# Patient Record
Sex: MALE | Race: WHITE
[De-identification: names, ages, dates, MRNs, and addresses within clinical notes are randomized per-mention and may not be internally consistent; named-entity substitution may affect disease eponyms.]

---

## 2023-01-16 ENCOUNTER — HOSPITAL ENCOUNTER (OUTPATIENT)
Dept: HOSPITAL 47 - LABPAT | Age: 88
End: 2023-01-16
Attending: ORTHOPAEDIC SURGERY
Payer: MEDICARE

## 2023-01-16 DIAGNOSIS — M16.12: Primary | ICD-10-CM

## 2023-01-16 LAB
ALBUMIN SERPL-MCNC: 4.1 G/DL (ref 3.8–4.9)
ALBUMIN/GLOB SERPL: 1.56 G/DL (ref 1.6–3.17)
ALP SERPL-CCNC: 62 U/L (ref 41–126)
ALT SERPL-CCNC: 14 U/L (ref 10–49)
ANION GAP SERPL CALC-SCNC: 11.5 MMOL/L (ref 10–18)
APTT BLD: 32.4 SEC (ref 22–30)
AST SERPL-CCNC: 17 U/L (ref 14–35)
BUN SERPL-SCNC: 24.1 MG/DL (ref 9–27)
BUN/CREAT SERPL: 23.86 RATIO (ref 12–20)
CALCIUM SPEC-MCNC: 9.1 MG/DL (ref 8.7–10.3)
CHLORIDE SERPL-SCNC: 104 MMOL/L (ref 96–109)
CO2 SERPL-SCNC: 24.4 MMOL/L (ref 20–27.5)
ERYTHROCYTE [DISTWIDTH] IN BLOOD BY AUTOMATED COUNT: 4.38 X 10*6/UL (ref 4.4–5.6)
ERYTHROCYTE [DISTWIDTH] IN BLOOD: 14.1 % (ref 11.5–14.5)
GLOBULIN SER CALC-MCNC: 2.6 G/DL (ref 1.6–3.3)
GLUCOSE SERPL-MCNC: 89 MG/DL (ref 70–110)
HCT VFR BLD AUTO: 40.3 % (ref 39.6–50)
HGB BLD-MCNC: 12.9 G/DL (ref 13–17)
INR PPP: 1 (ref ?–1.2)
MCH RBC QN AUTO: 29.5 PG (ref 27–32)
MCHC RBC AUTO-ENTMCNC: 32 G/DL (ref 32–37)
MCV RBC AUTO: 92 FL (ref 80–97)
NRBC BLD AUTO-RTO: 0 /100 WBCS (ref 0–0)
PH UR: 5.5 [PH] (ref 5–8)
PLATELET # BLD AUTO: 247 X 10*3/UL (ref 140–440)
POTASSIUM SERPL-SCNC: 4.5 MMOL/L (ref 3.5–5.5)
PROT SERPL-MCNC: 6.7 G/DL (ref 6.2–8.2)
PT BLD: 10.5 SEC (ref 9–12)
SODIUM SERPL-SCNC: 140 MMOL/L (ref 135–145)
SP GR UR: 1.02 (ref 1–1.03)
UROBILINOGEN UR QL: 0.2
WBC # BLD AUTO: 6.72 X 10*3/UL (ref 4.5–10)

## 2023-01-16 PROCEDURE — 85027 COMPLETE CBC AUTOMATED: CPT

## 2023-01-16 PROCEDURE — 81001 URINALYSIS AUTO W/SCOPE: CPT

## 2023-01-16 PROCEDURE — 85730 THROMBOPLASTIN TIME PARTIAL: CPT

## 2023-01-16 PROCEDURE — 80053 COMPREHEN METABOLIC PANEL: CPT

## 2023-01-16 PROCEDURE — 87070 CULTURE OTHR SPECIMN AEROBIC: CPT

## 2023-01-16 PROCEDURE — 85610 PROTHROMBIN TIME: CPT

## 2023-01-24 ENCOUNTER — HOSPITAL ENCOUNTER (OUTPATIENT)
Dept: HOSPITAL 47 - OR | Age: 88
LOS: 3 days | Discharge: NURSING, REHAB OR LONG-TERM CARE | End: 2023-01-27
Attending: ORTHOPAEDIC SURGERY
Payer: MEDICARE

## 2023-01-24 VITALS — BODY MASS INDEX: 29 KG/M2

## 2023-01-24 DIAGNOSIS — G89.18: ICD-10-CM

## 2023-01-24 DIAGNOSIS — Z79.899: ICD-10-CM

## 2023-01-24 DIAGNOSIS — Z86.16: ICD-10-CM

## 2023-01-24 DIAGNOSIS — Z95.5: ICD-10-CM

## 2023-01-24 DIAGNOSIS — E78.5: ICD-10-CM

## 2023-01-24 DIAGNOSIS — Z85.46: ICD-10-CM

## 2023-01-24 DIAGNOSIS — I25.10: ICD-10-CM

## 2023-01-24 DIAGNOSIS — I10: ICD-10-CM

## 2023-01-24 DIAGNOSIS — Z92.3: ICD-10-CM

## 2023-01-24 DIAGNOSIS — Z98.890: ICD-10-CM

## 2023-01-24 DIAGNOSIS — Z79.82: ICD-10-CM

## 2023-01-24 DIAGNOSIS — Z88.6: ICD-10-CM

## 2023-01-24 DIAGNOSIS — Z90.49: ICD-10-CM

## 2023-01-24 DIAGNOSIS — Z87.891: ICD-10-CM

## 2023-01-24 DIAGNOSIS — M16.12: Primary | ICD-10-CM

## 2023-01-24 PROCEDURE — 88300 SURGICAL PATH GROSS: CPT

## 2023-01-24 PROCEDURE — 86850 RBC ANTIBODY SCREEN: CPT

## 2023-01-24 PROCEDURE — 27130 TOTAL HIP ARTHROPLASTY: CPT

## 2023-01-24 PROCEDURE — 97530 THERAPEUTIC ACTIVITIES: CPT

## 2023-01-24 PROCEDURE — 76942 ECHO GUIDE FOR BIOPSY: CPT

## 2023-01-24 PROCEDURE — 73501 X-RAY EXAM HIP UNI 1 VIEW: CPT

## 2023-01-24 PROCEDURE — 97166 OT EVAL MOD COMPLEX 45 MIN: CPT

## 2023-01-24 PROCEDURE — 86901 BLOOD TYPING SEROLOGIC RH(D): CPT

## 2023-01-24 PROCEDURE — 97116 GAIT TRAINING THERAPY: CPT

## 2023-01-24 PROCEDURE — 64447 NJX AA&/STRD FEMORAL NRV IMG: CPT

## 2023-01-24 PROCEDURE — 86900 BLOOD TYPING SEROLOGIC ABO: CPT

## 2023-01-24 PROCEDURE — 85025 COMPLETE CBC W/AUTO DIFF WBC: CPT

## 2023-01-24 PROCEDURE — 97161 PT EVAL LOW COMPLEX 20 MIN: CPT

## 2023-01-24 RX ADMIN — POTASSIUM CHLORIDE SCH MLS: 14.9 INJECTION, SOLUTION INTRAVENOUS at 10:08

## 2023-01-24 RX ADMIN — DOCUSATE SODIUM AND SENNOSIDES SCH EACH: 50; 8.6 TABLET ORAL at 21:42

## 2023-01-24 RX ADMIN — ASPIRIN SCH: 325 TABLET ORAL at 21:44

## 2023-01-24 RX ADMIN — ASPIRIN 325 MG ORAL TABLET SCH MG: 325 PILL ORAL at 21:42

## 2023-01-24 RX ADMIN — HYDROMORPHONE HYDROCHLORIDE PRN MG: 1 INJECTION, SOLUTION INTRAMUSCULAR; INTRAVENOUS; SUBCUTANEOUS at 16:28

## 2023-01-24 NOTE — FL
EXAMINATION TYPE: FL guidance operating room

 

DATE OF EXAM: 1/24/2023

 

FLUOROSCOPY

 

Fluoroscopy time of 56 seconds was used during anterior left hip replacement.  3 image/s document/s t
he procedure.

## 2023-01-24 NOTE — P.ANPRN
Procedure Note - Anesthesia





- Nerve Block Performed


  ** Left Davey Single


Time Out Performed: Yes (1010)


Date of Procedure: 01/24/23


Procedure Start Time: 10:10


Procedure Stop Time: 10:19


Location of Patient: PreOp


Indication: Acute Post-Operative Pain, Dx/Pain Location, Requested by Surgeon


Specifically requested for management of pain by : Jarocho Baron


Sedation Type: Sedate with meaningful contact maintained


Preparation: Sterile Prep


Position: Supine


Catheter: None


Needle Types: Pajunk


Needle Gauge: 21 (100 mm)


Ultrasound used to visualize needle placement: Yes


Ultrasound used to observe medication spread: Yes


Injectate: 0.5% Ropivacaine (see comment for volume) (20 cc + 10 cc of Normal 

saline)


Blood Aspirated: No


Pain Paresthesia on Injection Noted: No


Resistance on Injection: Normal


Image Stored and Saved: Yes


Events: Uneventful and Well Tolerated

## 2023-01-24 NOTE — P.OP
Date of Procedure: 01/24/23


Preoperative Diagnosis: 


Severe osteoarthritis left hip


Postoperative Diagnosis: 


Severe osteoarthritis left hip


Procedure(s) Performed: 


Left total hip arthroplasty with a direct anterior approach


Implants: 


[employer] Polarstem standard size 4 with a collar


[employer] R3, multi-hole hemispherical acetabular shell, 56 mm


[employer] Reflection 6.5 mm cancellus screw, 20 mm 2, 25 mm, 15 mm


[employer] R3, XLPE 20 acetabular liner 


[employer] Oxinium femoral head 36 m, +8


All components were press-fit.


The articulation is Oxinium on polyethylene.


Anesthesia: spinal


Surgeon: Jarocho Baron


Assistant #1: Sarina Gonzales


Estimated Blood Loss (ml): 200


Pathology: other (Femoral head)


Condition: stable


Disposition: PACU


Indications for Procedure: 


After failure of conservative treatment we discussed the surgical and 

nonsurgical treatment options at length.  Patient wishes to proceed with a total

hip arthroplasty with a direct anterior approach.  Complications specific to 

this procedure were discussed at length, including but not limited to infection,

leg length discrepancy, dislocation, nerve injury, and fracture.  Covid-19 was 

also discussed at length with the patient, and they are aware of the current 

policies and procedures.  The patient was given the option of delaying surgery, 

but they elect to proceed knowing these risks.  Patient is aware of all these 

complications and informed consent was obtained


Operative Findings: 


The operative findings are consistent with severe osteoarthritis of the left hip


Description of Procedure: 


The patient was seen and evaluated in the preoperative area and the consent was 

reviewed.  The operative site was marked with a skin marker.  The patient 

verified the procedure and operative site.  A PENG block was placed by 

anesthesia in the preoperative area. The patient was then brought to the 

operating room and given preoperative antibiotics intravenously.  1 g of 

Tranexamic acid was also given intravenously.  A spinal anesthetic was 

administered by the anesthesia department.   The patient was then placed on the 

Russell table with the bony prominences well-padded.  The hip area was then prepped

with a ChloraPrep solution and draped in the usual sterile fashion.





A universal timeout was then performed, which confirmed the patient's name, 

surgical site, ALLERGIES, and procedure being performed on the consent.  Next 

the incision site was located at 1 cm distal and 4 cm lateral to the anterior 

superior iliac spine.  The skin and subcutaneous tissues were sharply incised.  

Incision was carefully dissected down to the fascia overlying the tensor fascia 

elijah muscle.  This fascia was then incised in line with the muscle fibers.  Care

was taken to stay laterally in order to avoid injuring the lateral femoral 

cutaneous nerve.  Next, using blunt finger dissection, the tensor fascia elijah 

muscle was dissected off its investing fascia.  The muscle was then carefully 

retracted laterally with a cobra retractor over the lateral neck of the femur.  

Next, the circumflex vessels were identified and cauterized using the Aquamantis

device.  The anterior hip capsule was then exposed.  The capsule was then opened

and an inverted T fashion.  The retractors were then placed intracapsularly.  

The retractors were maintained intracapsular throughout the procedure.  The 

proximal femur was then visualized.  Fluoroscopic x-rays were then taken in 

order to evaluate the preoperative leg lengths.  A small amount of traction was 

placed on the leg.





The femoral neck was then osteotomized at the appropriate level above the lesser

trochanter.  A small wedge of bone was then removed from the remaining femoral 

head.  Next, using a corkscrew the femoral head was removed from the acetabulum.

 On gross visual inspection, the femoral head had complete loss of articular 

cartilage and multiple periarticular osteophytes.  The femoral head was then 

measured.  Attention was then turned to the acetabulum.





The acetabulum was exposed and any remaining labrum was excised.  Sequential 

reaming of the acetabulum was performed using fluoroscopic guidance until there 

was a good bed of bleeding cancellus bone.  When the appropriate size was 

reached, a trial was then placed.  The position and fit of the trial was checked

with fluoroscopy.  The trial was then removed.  Then, using fluoroscopic 

guidance, the final implant was impacted at 20 of anteversion and 40 of 

abduction, and fully seated in the acetabulum.  4 screws were then placed in the

acetabulum.  Again fluoroscopy was used to check position of the screws.  Next, 

the liner was then impacted, with a 20 elevated liner located in the anterior 

superior quadrant.  Component locking was confirmed.





Attention was then directed to the femur.  With the aid of the Russell table, the 

femur was externally rotated to approximately 130, extended, and adducted under

the opposite leg.  A side hook was then placed under the proximal femur, and the

side hook elevator was used to elevate the proximal femur while releasing the 

capsule.  Retractors were then placed.  A capsular release was performed, as 

well as a release of the conjoined tendon, which afforded excellent 

visualization of the proximal femur.  Next, a box osteotome was used to 

lateralize the proximal femur.  A  was then used to locate the 

femoral canal.  Sequential broaching was then performed with appropriate size 

which afforded excellent fixation in the proximal femur.  A trial was then 

placed with appropriate head and neck, and the hip was gently reduced with the 

aid of the Russell table.  Fluoroscopy was then used to check position of the 

components, as well as to evaluate the leg lengths and offset.  The leg lengths 

and offset were measured as closely as possible to ensure stability of the hip. 

The hip was then gently dislocated and the trials were then removed.  Final 

implants were then impacted and the hip was again reduced.  Final fluoroscopic 

x-rays confirmed that the components were in anatomic position.  The leg lengths

and offset were measured and were found to coincide with the trial measurements.

 The hip was also taken through range of motion, and found to be stable.





The hip was then copiously irrigated with antibiotic solution with pulsatile 

lavage.  The hip was then irrigated with Irrisept solution.  The soft tissues 

were then injected with a ropivacaine solution.  A second dose of 1 g of 

Tranexamic acid was also given intravenously. 





The fascia was then closed with 2-0 strata fix suture.  The subcutaneous tissue 

was closed with 3-0 Vicryl.  The subcuticular tissue was closed with 3-0 strata 

fix suture.  The skin was then closed with Exofin skin glue.  After the glue and

dried, and Optifoam silver impregnated dressing was applied.  The patient was 

then transferred to the recovery room in stable condition.





The assistant  YASIR Cox was required due to the complexity of surgery, 

and the need for skilled surgical assistant for positioning, draping, exposure, 

retraction, and closure of the wound.

## 2023-01-24 NOTE — XR
EXAMINATION TYPE: XR Hip Limited LT

 

DATE OF EXAM: 1/24/2023

 

CLINICAL HISTORY: Postoperative evaluation

 

TECHNIQUE: Single portable view of the left hip was submitted.  

 

FINDINGS: Noted are changes of total hip arthroplasty with femoral and acetabular components appearin
g well seated.  Alignment is anatomic.  Postsurgical soft tissue changes are evident. 

 

IMPRESSION: Satisfactory postoperative alignment

## 2023-01-25 LAB
BASOPHILS # BLD AUTO: 0.02 X 10*3/UL (ref 0–0.1)
BASOPHILS NFR BLD AUTO: 0.2 %
EOSINOPHIL # BLD AUTO: 0.04 X 10*3/UL (ref 0.04–0.35)
EOSINOPHIL NFR BLD AUTO: 0.4 %
ERYTHROCYTE [DISTWIDTH] IN BLOOD BY AUTOMATED COUNT: 3.84 X 10*6/UL (ref 4.4–5.6)
ERYTHROCYTE [DISTWIDTH] IN BLOOD: 13.9 % (ref 11.5–14.5)
HCT VFR BLD AUTO: 35.4 % (ref 39.6–50)
HGB BLD-MCNC: 11.5 G/DL (ref 13–17)
IMM GRANULOCYTES BLD QL AUTO: 0.2 %
LYMPHOCYTES # SPEC AUTO: 0.87 X 10*3/UL (ref 0.9–5)
LYMPHOCYTES NFR SPEC AUTO: 9.3 %
MCH RBC QN AUTO: 29.9 PG (ref 27–32)
MCHC RBC AUTO-ENTMCNC: 32.5 G/DL (ref 32–37)
MCV RBC AUTO: 92.2 FL (ref 80–97)
MONOCYTES # BLD AUTO: 1.23 X 10*3/UL (ref 0.2–1)
MONOCYTES NFR BLD AUTO: 13.2 %
NEUTROPHILS # BLD AUTO: 7.14 X 10*3/UL (ref 1.8–7.7)
NEUTROPHILS NFR BLD AUTO: 76.7 %
NRBC BLD AUTO-RTO: 0 /100 WBCS (ref 0–0)
PLATELET # BLD AUTO: 251 X 10*3/UL (ref 140–440)
WBC # BLD AUTO: 9.32 X 10*3/UL (ref 4.5–10)

## 2023-01-25 RX ADMIN — POTASSIUM CHLORIDE SCH: 14.9 INJECTION, SOLUTION INTRAVENOUS at 09:12

## 2023-01-25 RX ADMIN — THERA TABS SCH EACH: TAB at 08:08

## 2023-01-25 RX ADMIN — HYDROMORPHONE HYDROCHLORIDE PRN MG: 1 INJECTION, SOLUTION INTRAMUSCULAR; INTRAVENOUS; SUBCUTANEOUS at 00:04

## 2023-01-25 RX ADMIN — ASPIRIN 325 MG ORAL TABLET SCH MG: 325 PILL ORAL at 08:08

## 2023-01-25 RX ADMIN — DOCUSATE SODIUM AND SENNOSIDES SCH EACH: 50; 8.6 TABLET ORAL at 21:24

## 2023-01-25 RX ADMIN — PANTOPRAZOLE SODIUM SCH MG: 40 TABLET, DELAYED RELEASE ORAL at 08:39

## 2023-01-25 RX ADMIN — ENOXAPARIN SODIUM SCH MG: 30 INJECTION SUBCUTANEOUS at 21:24

## 2023-01-25 RX ADMIN — ASPIRIN SCH: 325 TABLET ORAL at 23:41

## 2023-01-25 RX ADMIN — MONTELUKAST SODIUM SCH MG: 10 TABLET, FILM COATED ORAL at 08:08

## 2023-01-25 NOTE — P.CONS
History of Present Illness





- Reason for Consult


Consult date: 01/24/23


Medical management


Requesting physician: Jarocho Baron





- Chief Complaint


Hip surgery





- History of Present Illness





This is a pleasant 87-year-old patient, follows with Dr. Reese.  Chronic 

stable medical conditions include CAD with stent, hypertension, hyperlipidemia, 

peptic ulcer disease.


Patient earlier today underwent left total hip arthroplasty.  Some pain.  No ch

est pain no shortness breath.  Comfortable.  Laying in bed.





Review of systems:


GEN.:  Retired


EYES: None


HEENT: None


NECK: None


RESPIRATORY: None


CARDIOVASCULAR: None


GASTROINTESTINAL: None


GENITOURINARY: None


MUSCULOSKELETAL: Joint pains


LYMPHATICS: None


HEMATOLOGICAL: None  


PSYCHIATRY: None


NEUROLOGICAL: None





Past medical history to include:


Hypertension, hyperlipidemia, osteoarthritis, prostate cancer in 2004 treated 

with radiation, bleeding peptic ulcer in 1970s,: Infection in December 2022, CAD

with stent 2





Social history:


This smoke in the past chewing tobacco.  No alcohol.  Did multiple jobs 

including .





Physical examination:


VITAL SIGNS: 97.9, 16, 17, 171/73, 98% room air]


GENERAL: BMI 30, declining but awake.


EYES: Pupils equal.  Conjunctiva normal.


HEENT: External appearance of nose and ears normal, oral cavity grossly normal.


NECK: JVD not raised; masses not palpable.


HEART: First and second heart sounds are normal;  no edema.  


LUNGS: Respiratory rate normal; clear to auscultation.  


ABDOMEN: Soft,  nontender, liver spleen not palpable, no masses palpable.  


PSYCH: Alert and oriented x3;  mood  and affect normal.  


MUSCULOSKELETAL:No Clubbing/cyanosis;muscles-grossly intact.  OA


NEUROLOGICAL: Cranial nerves grossly intact; no facial asymmetry,   power and 

sensation grossly intact. 


LYMPHATICS: No lymph nodes palpable in the axilla and neck





INVESTIGATIONS, reviewed in the clinical context:


01/16/2023:


White count 6.7 hemoglobin 12.9 potassium 4.5 creatinine 1.0





Assessment and plan: 1





-Left total hip arthroplasty


Aspirin for DVT prophylaxis.  Pain control.





-Primary osteoarthritis


Pain medications as needed





-CAD with a prior history of stent


Tenormin, aspirin





-Peptic ulcer disease in the past.


Prilosec





-Essential hypertension


Enalapril, Tenormin





-Hyperlipidemia


Simvastatin





Care was discussed with the patient.  Question answered.


Thank you Dr. Baron





Past Medical History


Past Medical History: Cancer, Hyperlipidemia, Hypertension, Osteoarthritis (OA)


Additional Past Medical History / Comment(s): Covid infection Dec 2022,prostate 

CA-2004 40+ radiation txs, bleeding ulcer 1970


History of Any Multi-Drug Resistant Organisms: None Reported


Past Surgical History: Cholecystectomy, Heart Catheterization With Stent, Hernia

Repair


Additional Past Surgical History / Comment(s): cardiac stents x2,rt hip x2,left 

total knee


Past Anesthesia/Blood Transfusion Reactions: No Reported Reaction


Additional Past Anesthesia/Blood Transfusion Reaction / Comm: no hx blood 

transfusion


Date of Last Stent Placement:: 3-30-16,2006


Past Psychological History: No Psychological Hx Reported


Smoking Status: Former smoker


Past Alcohol Use History: None Reported


Additional Past Alcohol Use History / Comment(s): chewed tobacco for 30 yrs,quit

chewing tobacco around age 61s


Past Drug Use History: None Reported





- Past Family History


  ** Mother


Family Medical History: No Reported History





Medications and Allergies


                                Home Medications











 Medication  Instructions  Recorded  Confirmed  Type


 


Aspirin 81 mg PO DAILY 01/19/23 01/19/23 History


 


Enalapril Maleate 20 mg PO QAM 01/19/23 01/19/23 History


 


Enalapril [Vasotec] 10 mg PO  01/19/23 01/19/23 History


 


Fluticasone Nasal Spray [Flonase 2 spray EA NOSTRIL BID PRN 01/19/23 01/19/23 

History





Nasal [place]]    


 


Montelukast Sodium [Singulair] 10 mg PO QA 01/19/23 01/19/23 History


 


Multivitamins, Thera [Multivitamin 1 tab PO DAILY 01/19/23 01/19/23 History





(formulary)]    


 


Omeprazole [PriLOSEC] 20 mg PO QAM 01/19/23 01/19/23 History


 


Simvastatin 40 mg PO  01/19/23 01/19/23 History


 


atenoloL [Tenormin] 25 mg PO  01/19/23 01/19/23 History


 


Aspirin 325 mg PO BID #60 tab 01/24/23  Rx


 


Sennosides [Senokot] 2 tab PO DAILY PRN #60 tablet 01/24/23  Rx


 


traMADol HCl [Ultram] 1 - 2 tab PO Q6H PRN #32 tab 01/24/23  Rx








                                    Allergies











Allergy/AdvReac Type Severity Reaction Status Date / Time


 


atorvastatin Allergy  muscle pain Verified 01/24/23 09:43














Physical Exam


Vitals: 


                                   Vital Signs











  Temp Pulse Resp BP Pulse Ox


 


 01/24/23 20:00  97.9 F  60  17  171/73  98


 


 01/24/23 18:07     170/74 


 


 01/24/23 16:54   57 L   220/90  96


 


 01/24/23 16:48     195/81 


 


 01/24/23 16:39   52 L   195/81  96


 


 01/24/23 16:24   55 L   232/87  99


 


 01/24/23 16:23     220/84 


 


 01/24/23 16:08   65   227/92  99


 


 01/24/23 16:04   55 L   221/73  99


 


 01/24/23 15:55   67   213/90  96


 


 01/24/23 15:39   63   205/79  100


 


 01/24/23 15:24   57 L   200/81  99


 


 01/24/23 15:09   55 L   195/83  98


 


 01/24/23 14:54   57 L   191/75  98


 


 01/24/23 14:39   66   183/86  95


 


 01/24/23 14:23   56 L  18  183/74  99


 


 01/24/23 13:54   65  20  148/61  97


 


 01/24/23 13:39   63  21  140/62  95


 


 01/24/23 13:24   59 L  12  148/63  94 L


 


 01/24/23 13:09   64  9 L  153/67  94 L


 


 01/24/23 10:27   54 L  14  174/72  99


 


 01/24/23 10:04   59 L   214/84 


 


 01/24/23 09:46  97.1 F L  60  16  238/107  99








                                Intake and Output











 01/24/23 01/24/23 01/24/23





 06:59 14:59 22:59


 


Intake Total  1201 720


 


Output Total  200 


 


Balance  1001 720


 


Intake:   


 


  IV  1201 


 


  Oral   720


 


Output:   


 


  Estimated Blood Loss  200 


 


Other:   


 


  Weight  84.3 kg 














Results


CBC & Chem 7: 


                                 01/25/23 05:22

## 2023-01-26 RX ADMIN — MONTELUKAST SODIUM SCH MG: 10 TABLET, FILM COATED ORAL at 08:46

## 2023-01-26 RX ADMIN — ENOXAPARIN SODIUM SCH MG: 30 INJECTION SUBCUTANEOUS at 21:38

## 2023-01-26 RX ADMIN — ENOXAPARIN SODIUM SCH MG: 30 INJECTION SUBCUTANEOUS at 08:46

## 2023-01-26 RX ADMIN — DOCUSATE SODIUM AND SENNOSIDES SCH: 50; 8.6 TABLET ORAL at 21:38

## 2023-01-26 RX ADMIN — ASPIRIN SCH: 325 TABLET ORAL at 21:38

## 2023-01-26 RX ADMIN — THERA TABS SCH EACH: TAB at 08:46

## 2023-01-26 RX ADMIN — PANTOPRAZOLE SODIUM SCH MG: 40 TABLET, DELAYED RELEASE ORAL at 08:46

## 2023-01-26 NOTE — P.PN
Progress Note - Text


Progress Note Date: 01/25/23





- Chief Complaint


Hip surgery





Hospital course


This is a pleasant 87-year-old patient, follows with Dr. Reese.  Chronic 

stable medical conditions include CAD with stent, hypertension, hyperlipidemia, 

peptic ulcer disease.


Patient earlier today underwent left total hip arthroplasty.  Some pain.  No 

chest pain no shortness breath.  Comfortable.  Laying in bed.


January 25: No chest pain no shortness breath.  Oral intake fair.  Plan is for 

patient to go to rehab.  Working with therapy.  Patient concerned about his 

peptic ulcer disease because of aspirin.  Discussed with Rehana Torrez from 

orthopedics.  Change patient to subcu Lovenox.  DC aspirin.





Active Medications





Atenolol (Atenolol 25 Mg Tab)  25 mg PO [facility]


   Last Admin: 01/24/23 21:42 Dose:  25 mg


   


Enoxaparin Sodium (Enoxaparin 30 Mg/0.3 Ml Syringe)  30 mg SQ Q12HR [facility]


   Stop: 02/02/23 23:59


Hydromorphone HCl (Hydromorphone 0.5 Mg/0.5 Ml Syringe)  0.125 mg IVP Q3HR PRN


   PRN Reason: Pain Scale 1 to 3


   Stop: 02/23/23 08:52


Hydromorphone HCl (Hydromorphone 0.5 Mg/0.5 Ml Syringe)  0.5 mg IVP Q3HR PRN


   PRN Reason: Pain Scale 7 to 10


   Stop: 02/23/23 08:52


   Last Admin: 01/25/23 00:04 Dose:  0.5 mg


   


Hydromorphone HCl (Hydromorphone 0.5 Mg/0.5 Ml Syringe)  0.25 mg IVP Q3HR PRN


   PRN Reason: Pain Scale 4 to 6


   Stop: 02/23/23 08:52


Lactated Ringer's (Lactated Ringers)  1,000 mls @ 20 mls/hr IV .Q24H [facility]


   Stop: 02/23/23 09:25


   Last Admin: 01/25/23 09:12 Dose:  Not Given


   


Lisinopril (Lisinopril 20 Mg Tab)  20 mg PO [facility]


   Last Admin: 01/24/23 21:42 Dose:  20 mg


   


Lisinopril (Lisinopril 20 Mg Tab)  40 mg PO [facility]


   Last Admin: 01/25/23 08:08 Dose:  40 mg


   


Magnesium Hydroxide (Magnesium Hydroxide 2,400 Mg/10 Ml Cup)  2,400 mg PO DAILY 

PRN


   PRN Reason: Constipation


   Stop: 02/23/23 08:52


Montelukast Sodium ([facility] 10 Mg Tab)  [facility]


   Last Admin: 01/25/23 08:08 Dose:  10 mg


   


Multivitamins (Multivitamins, Thera 1 Each Tab)  1 each PO DAILY [facility]


   Last Admin: 01/25/23 08:08 Dose:  1 each


   


Naloxone HCl (Naloxone 0.4 Mg/Ml 1 Ml Vial)  0.2 mg IV Q2M PRN


   PRN Reason: Opioid Reversal


   Stop: 02/23/23 08:52


Non-Formulary Medication (Simvastatin [Simvastatin])  40 mg PO [facility]


   Last Admin: 01/24/23 21:44 Dose:  Not Given


   


Pantoprazole Sodium (Pantoprazole 40 Mg Tablet)  40 mg PO [facility]


   Last Admin: 01/25/23 08:39 Dose:  40 mg


   


Senna/Docusate Sodium (Sennosides-Docusate Sodium 1 Each Tab)  2 each PO [facility]


   Stop: 02/23/23 21:01


   Last Admin: 01/24/23 21:42 Dose:  2 each


   


Tramadol HCl (Tramadol 50 Mg Tab)  50 mg PO Q6H PRN


   PRN Reason: Pain Scale 1 to 5


   Stop: 02/23/23 08:56


   Last Admin: 01/25/23 08:31 Dose:  50 mg


   


Tramadol HCl (Tramadol 50 Mg Tab)  100 mg PO QID PRN


   PRN Reason: Pain Scale 6 to 10


   Stop: 02/23/23 08:56


   Last Admin: 01/25/23 13:46 Dose:  100 mg


   








Past medical history to include:


Hypertension, hyperlipidemia, osteoarthritis, prostate cancer in 2004 treated 

with radiation, bleeding peptic ulcer in 1970s,: Infection in December 2022, CAD

 with stent 2





Social history:


This smoke in the past chewing tobacco.  No alcohol.  Did multiple jobs 

including .





Physical examination:


VITAL SIGNS: 97.8, 59, 18, 125/67, 96% room air


GENERAL: Sitting up, comfortable.


EYES: Pupils equal.  Conjunctiva normal.


HEENT: External appearance of nose and ears normal, oral cavity grossly normal.


NECK: JVD not raised; masses not palpable.


HEART: First and second heart sounds are normal;  no edema.  


LUNGS: Respiratory rate normal; clear to auscultation.  


ABDOMEN: Soft,  nontender, liver spleen not palpable, no masses palpable.  


PSYCH: Alert and oriented x3;  mood  and affect normal.  


MUSCULOSKELETAL:No Clubbing/cyanosis;muscles-grossly intact.  OA








INVESTIGATIONS, reviewed in the clinical context:


January 25: White count 9.3 hemoglobin 11.5 platelets 251


01/16/2023:


White count 6.7 hemoglobin 12.9 potassium 4.5 creatinine 1.0





Assessment and plan: 1





-Left total hip arthroplasty


DC aspirin because of peptic ulcer disease.  Subcu Lovenox 30 mg every 12 for 7 

days.  Pain control.





-Primary osteoarthritis


Pain medications as needed





-CAD with a prior history of stent


Tenormin, aspirin 81 mg





-Peptic ulcer disease in the past.


Prilosec





-Essential hypertension


Enalapril, Tenormin





-Hyperlipidemia


Simvastatin





DC aspirin.  Subcu Lovenox.  Other medications to continue.  Patient presented 

to rehab tomorrow.


Thank you Dr. Baron
Progress Note - Text


Progress Note Date: 01/26/23





- Chief Complaint


Hip surgery





Hospital course


This is a pleasant 87-year-old patient, follows with Dr. Reese.  Chronic 

stable medical conditions include CAD with stent, hypertension, hyperlipidemia, 

peptic ulcer disease.


Patient earlier today underwent left total hip arthroplasty.  Some pain.  No 

chest pain no shortness breath.  Comfortable.  Laying in bed.


January 25: No chest pain no shortness breath.  Oral intake fair.  Plan is for 

patient to go to rehab.  Working with therapy.  Patient concerned about his 

peptic ulcer disease because of aspirin.  Discussed with Rehana Torrez from 

orthopedics.  Change patient to subcu Lovenox.  DC aspirin.


January 26: Stable.  Pain control.  Tolerating diet.  Will be going to rehab.  

No new issues.





Meds reviewed





Past medical history to include:


Hypertension, hyperlipidemia, osteoarthritis, prostate cancer in 2004 treated 

with radiation, bleeding peptic ulcer in 1970s,: Infection in December 2022, CAD

with stent 2





Social history:


This smoke in the past chewing tobacco.  No alcohol.  Did multiple jobs 

including .





Physical examination:


VITAL SIGNS: 97.9, 72, 18, 1 57 x 67, 94% room air


GENERAL: Sitting up, comfortable.


EYES: Pupils equal.  Conjunctiva normal.


HEENT: External appearance of nose and ears normal, oral cavity grossly normal.


NECK: JVD not raised; masses not palpable.


HEART: First and second heart sounds are normal;  no edema.  


LUNGS: Respiratory rate normal; clear to auscultation.  


ABDOMEN: Soft,  nontender, liver spleen not palpable, no masses palpable.  


PSYCH: Alert and oriented x3;  mood  and affect normal.  


MUSCULOSKELETAL:No Clubbing/cyanosis;muscles-grossly intact.  OA








INVESTIGATIONS, reviewed in the clinical context:


January 25: White count 9.3 hemoglobin 11.5 platelets 251


01/16/2023:


White count 6.7 hemoglobin 12.9 potassium 4.5 creatinine 1.0





Assessment and plan: 1





-Left total hip arthroplasty


DC aspirin because of peptic ulcer disease.  Subcu Lovenox 30 mg every 12 for 7 

days.  Pain control.





-Primary osteoarthritis


Pain medications as needed





-CAD with a prior history of stent


Tenormin, aspirin 81 mg





-Peptic ulcer disease in the past.


Prilosec





-Essential hypertension


Enalapril, Tenormin





-Hyperlipidemia


Simvastatin





Stable continue current medication treatment plan.


Thank you Dr. Baron
Subjective


Progress Note Date: 01/25/23


Principal diagnosis: 





Primary osteoarthritis left hip.


Status post total left hip arthroplasty with direct anterior approach.


This is an 87-year-old male who is status post total left hip arthroplasty with 

direct anterior approach.  The patient is doing well from an orthopedic sta

ndpoint.  He has no new complaints or concerns today.  Vital signs are stable.








Objective





- Vital Signs


Vital signs: 


                                   Vital Signs











Temp  97.7 F   01/25/23 06:52


 


Pulse  50 L  01/25/23 06:52


 


Resp  17   01/25/23 06:52


 


BP  113/61   01/25/23 06:52


 


Pulse Ox  96   01/25/23 06:52


 


FiO2      








                                 Intake & Output











 01/24/23 01/25/23 01/25/23





 18:59 06:59 18:59


 


Intake Total 1921 730 


 


Output Total 200 350 


 


Balance 1721 380 


 


Weight 84.3 kg  


 


Intake:   


 


  IV 1201  


 


  Intake, IV Titration  250 





  Amount   


 


    Lactated Ringers 1,000 ml  200 





    @ 20 mls/hr IV .Q24H [facility]   





    Rx#:[id number]   


 


    ceFAZolin 2 gm In Sodium  50 





    Chloride 0.9% 50 ml @ 100   





    mls/hr IVPB Q8H [facility] Rx#:   





    [id number]   


 


  Oral 720 480 


 


Output:   


 


  Urine  350 


 


  Estimated Blood Loss 200  














- Exam


This is a pleasant 87-year-old male in no acute distress.  He is alert and orien

lauren 3.  Exam of the left hip reveals that his dressing is clean, dry and 

intact.  He has full foot and ankle motion without difficulty or pain.  

Neurovascular status to the lower extremity is intact.








Assessment and Plan


(1) Primary localized osteoarthritis of left hip


Current Visit: Yes   Status: Acute   Code(s): M16.12 - UNILATERAL PRIMARY 

OSTEOARTHRITIS, LEFT HIP   SNOMED Code(s): 133789027380888


   





(2) Status post total hip replacement, left


Current Visit: Yes   Status: Acute   Code(s): Z96.642 - PRESENCE OF LEFT 

ARTIFICIAL HIP JOINT   SNOMED Code(s): 874385911119


   


Plan: 


The clinical findings are discussed with the patient.  He is awaiting rehab 

placement.  We will begin physical therapy today.  Plan discharged to inpatient 

rehab when cleared medically and placement is arranged.
complains of pain/discomfort

## 2023-01-26 NOTE — P.DS
Providers


Expected date of discharge: 01/26/23


Attending physician: 


Jarocho Baron





Consults: 





                                        





01/24/23 08:51


Consult Physician Routine 


   Consulting Provider: Soham Lyons


   Consult Reason/Comments: medical management


   Do you want consulting provider notified?: Yes











Primary care physician: 


Michele Reese








- Discharge Diagnosis(es)


(1) Primary localized osteoarthritis of left hip


Current Visit: Yes   Status: Acute   





(2) Status post total hip replacement, left


Current Visit: Yes   Status: Acute   


Hospital Course: 


This is a 87-year-old male with known history of degenerative arthritis of the 

left hip.  The patient presented for evaluation as an outpatient.  After 

discussion and consideration patient elects to proceed with total hip 

arthroplasty.  The patient is seen preoperatively by Dr. Baron and medically 

cleared for surgery by their primary care physician.





Patient is admitted to [facility] on 01/24/2020 for total hip 

arthroplasty.  The procedure is performed without complication or sequelae.  The

patient is doing well postoperatively.  Labs and vital signs are stable on day 

of discharge.





On day of discharge patient's hip incision is healing well.  There is minimal 

erythema.  There is no drainage noted at this time.  There is minimal soft 

tissue swelling to the hip and thigh.  Patient has full foot and ankle motion 

without difficulty or pain.  Calf is soft and nontender to palpation.  

Neurovascular status to the left lower extremity is intact.  Patient is 

discharged to rehab in good condition. Please see med rec for accurate list of 

home medications.








Plan - Discharge Summary


Discharge Rx Participant: Yes


New Discharge Prescriptions: 


New


   Sennosides [Senokot] 2 tab PO DAILY PRN #60 tablet


     PRN Reason: Constipation


   traMADol HCl [Ultram] 1 - 2 tab PO Q6H PRN #32 tab


     PRN Reason: Pain


   Aspirin 325 mg PO BID #60 tab





Continue


   atenoloL [Tenormin] 25 mg PO HS


   Fluticasone Nasal Spray [Flonase Nasal Spray] 2 spray EA NOSTRIL BID PRN


     PRN Reason: Congestion


   Montelukast Sodium [Singulair] 10 mg PO QAM


   Simvastatin 40 mg PO HS


   Enalapril [Vasotec] 10 mg PO HS


   Enalapril Maleate 20 mg PO QAM


   Aspirin 81 mg PO DAILY


   Multivitamins, Thera [Multivitamin (formulary)] 1 tab PO DAILY


   Omeprazole [PriLOSEC] 20 mg PO QAM


Discharge Medication List





Aspirin 81 mg PO DAILY 01/19/23 [History]


Enalapril Maleate 20 mg PO QAM 01/19/23 [History]


Enalapril [Vasotec] 10 mg PO HS 01/19/23 [History]


Fluticasone Nasal Spray [Flonase Nasal Spray] 2 spray EA NOSTRIL BID PRN 

01/19/23 [History]


Montelukast Sodium [Singulair] 10 mg PO QAM 01/19/23 [History]


Multivitamins, Thera [Multivitamin (formulary)] 1 tab PO DAILY 01/19/23 

[History]


Omeprazole [PriLOSEC] 20 mg PO QAM 01/19/23 [History]


Simvastatin 40 mg PO HS 01/19/23 [History]


atenoloL [Tenormin] 25 mg PO HS 01/19/23 [History]


Aspirin 325 mg PO BID #60 tab 01/24/23 [Rx]


Sennosides [Senokot] 2 tab PO DAILY PRN #60 tablet 01/24/23 [Rx]


traMADol HCl [Ultram] 1 - 2 tab PO Q6H PRN #32 tab 01/24/23 [Rx]








Follow up Appointment(s)/Referral(s): 


Jarocho Baron DO [Doctor of Osteopathic Medicine] - 2 Weeks


Activity/Diet/Wound Care/Special Instructions: 


Weightbearing as tolerated with walker.


Leave dressing intact.  Dressing may be removed by home care nurse or by patient

in 7 days. Then change dressing twice daily until follow up.


May shower with initial dressing intact and after removal.


If dressing become saturated, please remove.


Please take aspirin 325mg twice daily for 30 days to prevent blood clots.


Recommend use of compression stockings daily until follow up to help prevent 

swelling and blood clots. May remove at night before sleeping. 


Please follow-up with [facility] in 2 weeks and call with any 

questions or concerns, [phone].


Discharge Disposition: TRANSFER TO SNF/ECF

## 2023-01-27 VITALS
DIASTOLIC BLOOD PRESSURE: 56 MMHG | SYSTOLIC BLOOD PRESSURE: 157 MMHG | TEMPERATURE: 98 F | HEART RATE: 60 BPM | RESPIRATION RATE: 18 BRPM

## 2023-01-27 LAB
BASOPHILS # BLD AUTO: 0.04 X 10*3/UL (ref 0–0.1)
BASOPHILS NFR BLD AUTO: 0.4 %
EOSINOPHIL # BLD AUTO: 0.18 X 10*3/UL (ref 0.04–0.35)
EOSINOPHIL NFR BLD AUTO: 1.8 %
ERYTHROCYTE [DISTWIDTH] IN BLOOD BY AUTOMATED COUNT: 4.01 X 10*6/UL (ref 4.4–5.6)
ERYTHROCYTE [DISTWIDTH] IN BLOOD: 13.8 % (ref 11.5–14.5)
HCT VFR BLD AUTO: 36.6 % (ref 39.6–50)
HGB BLD-MCNC: 11.9 G/DL (ref 13–17)
IMM GRANULOCYTES BLD QL AUTO: 0.4 %
LYMPHOCYTES # SPEC AUTO: 0.7 X 10*3/UL (ref 0.9–5)
LYMPHOCYTES NFR SPEC AUTO: 7.2 %
MCH RBC QN AUTO: 29.7 PG (ref 27–32)
MCHC RBC AUTO-ENTMCNC: 32.5 G/DL (ref 32–37)
MCV RBC AUTO: 91.3 FL (ref 80–97)
MONOCYTES # BLD AUTO: 1.29 X 10*3/UL (ref 0.2–1)
MONOCYTES NFR BLD AUTO: 13.2 %
NEUTROPHILS # BLD AUTO: 7.5 X 10*3/UL (ref 1.8–7.7)
NEUTROPHILS NFR BLD AUTO: 77 %
NRBC BLD AUTO-RTO: 0 /100 WBCS (ref 0–0)
PLATELET # BLD AUTO: 262 X 10*3/UL (ref 140–440)
WBC # BLD AUTO: 9.75 X 10*3/UL (ref 4.5–10)

## 2023-01-27 RX ADMIN — THERA TABS SCH EACH: TAB at 07:35

## 2023-01-27 RX ADMIN — PANTOPRAZOLE SODIUM SCH MG: 40 TABLET, DELAYED RELEASE ORAL at 07:35

## 2023-01-27 RX ADMIN — POTASSIUM CHLORIDE SCH: 14.9 INJECTION, SOLUTION INTRAVENOUS at 03:35

## 2023-01-27 RX ADMIN — MONTELUKAST SODIUM SCH MG: 10 TABLET, FILM COATED ORAL at 07:35

## 2023-01-27 RX ADMIN — ENOXAPARIN SODIUM SCH MG: 30 INJECTION SUBCUTANEOUS at 07:34
